# Patient Record
Sex: MALE | Race: WHITE | URBAN - METROPOLITAN AREA
[De-identification: names, ages, dates, MRNs, and addresses within clinical notes are randomized per-mention and may not be internally consistent; named-entity substitution may affect disease eponyms.]

---

## 2020-07-10 ENCOUNTER — HOSPITAL ENCOUNTER (OUTPATIENT)
Dept: LAB | Age: 30
Discharge: HOME | End: 2020-07-10

## 2020-07-10 ENCOUNTER — OFFICE VISIT (OUTPATIENT)
Dept: INTERNAL MEDICINE CLINIC | Age: 30
End: 2020-07-10

## 2020-07-10 VITALS
HEART RATE: 60 BPM | BODY MASS INDEX: 24.72 KG/M2 | DIASTOLIC BLOOD PRESSURE: 72 MMHG | SYSTOLIC BLOOD PRESSURE: 129 MMHG | OXYGEN SATURATION: 97 % | WEIGHT: 176.6 LBS | TEMPERATURE: 98.6 F | HEIGHT: 71 IN | RESPIRATION RATE: 18 BRPM

## 2020-07-10 DIAGNOSIS — L29.0 ANAL PRURITUS: ICD-10-CM

## 2020-07-10 DIAGNOSIS — Z00.00 PREVENTATIVE HEALTH CARE: ICD-10-CM

## 2020-07-10 DIAGNOSIS — Z00.00 PREVENTATIVE HEALTH CARE: Primary | ICD-10-CM

## 2020-07-10 LAB
ALBUMIN SERPL-MCNC: 4.6 G/DL (ref 3.5–5)
ALBUMIN/GLOB SERPL: 1.5 {RATIO} (ref 1.1–2.2)
ALP SERPL-CCNC: 72 U/L (ref 45–117)
ALT SERPL-CCNC: 26 U/L (ref 12–78)
ANION GAP SERPL CALC-SCNC: 5 MMOL/L (ref 5–15)
AST SERPL-CCNC: 14 U/L (ref 15–37)
BILIRUB SERPL-MCNC: 0.8 MG/DL (ref 0.2–1)
BUN SERPL-MCNC: 11 MG/DL (ref 6–20)
BUN/CREAT SERPL: 10 (ref 12–20)
CALCIUM SERPL-MCNC: 9.2 MG/DL (ref 8.5–10.1)
CHLORIDE SERPL-SCNC: 105 MMOL/L (ref 97–108)
CHOLEST SERPL-MCNC: 192 MG/DL
CO2 SERPL-SCNC: 27 MMOL/L (ref 21–32)
CREAT SERPL-MCNC: 1.12 MG/DL (ref 0.7–1.3)
ERYTHROCYTE [DISTWIDTH] IN BLOOD BY AUTOMATED COUNT: 12.7 % (ref 11.5–14.5)
GLOBULIN SER CALC-MCNC: 3.1 G/DL (ref 2–4)
GLUCOSE SERPL-MCNC: 87 MG/DL (ref 65–100)
HCT VFR BLD AUTO: 44.8 % (ref 36.6–50.3)
HDLC SERPL-MCNC: 61 MG/DL
HDLC SERPL: 3.1 {RATIO} (ref 0–5)
HGB BLD-MCNC: 15 G/DL (ref 12.1–17)
LDLC SERPL CALC-MCNC: 121 MG/DL (ref 0–100)
LIPID PROFILE,FLP: ABNORMAL
MCH RBC QN AUTO: 31 PG (ref 26–34)
MCHC RBC AUTO-ENTMCNC: 33.5 G/DL (ref 30–36.5)
MCV RBC AUTO: 92.6 FL (ref 80–99)
NRBC # BLD: 0 K/UL (ref 0–0.01)
NRBC BLD-RTO: 0 PER 100 WBC
PLATELET # BLD AUTO: 264 K/UL (ref 150–400)
PMV BLD AUTO: 11 FL (ref 8.9–12.9)
POTASSIUM SERPL-SCNC: 4.8 MMOL/L (ref 3.5–5.1)
PROT SERPL-MCNC: 7.7 G/DL (ref 6.4–8.2)
RBC # BLD AUTO: 4.84 M/UL (ref 4.1–5.7)
SODIUM SERPL-SCNC: 137 MMOL/L (ref 136–145)
TRIGL SERPL-MCNC: 50 MG/DL (ref ?–150)
VLDLC SERPL CALC-MCNC: 10 MG/DL
WBC # BLD AUTO: 3.9 K/UL (ref 4.1–11.1)

## 2020-07-10 NOTE — PROGRESS NOTES
HISTORY OF PRESENT ILLNESS    New patient to my practice, referred to me by his wife. Prior medical care has been from none. he works as a .  his  past medical history was reviewed, discussed, and summarized in the list below. Goes by [employer]". Wife Jf Burgos is RN [employer] Served in [employer] until 2016. Review of Systems   All other systems reviewed and are negative, except as noted in HPI      Past Medical and Surgical History  Past Medical History:   Diagnosis Date    History of dislocation of shoulder     hx labral tear, repair in high school        has a past surgical history that includes hx shoulder arthroscopy (Right). No current outpatient medications on file. No current facility-administered medications for this visit. reports that he has never smoked. He does not have any smokeless tobacco history on file. He reports current alcohol use. He reports that he does not use drugs. family history includes Cancer (age of onset: 62) in his father; Heart Attack in his paternal grandfather; Hypertension in his father; No Known Problems in his sister. Physical Exam   Nursing note and vitals reviewed. Blood pressure 129/72, pulse 60, temperature 98.6 °F (37 °C), resp. rate 18, height 5' 11\" (1.803 m), weight 176 lb 9.6 oz (80.1 kg), SpO2 97 %. Constitutional: oriented to person, place, and time. No distress. Eyes: Conjunctivae are normal.   HEENT:  No cervical lymphadenopathy. No thyroid nodules or goiter  Cardiovascular: Normal rate. Regular rhythm, no murmurs, rubs. No edema  Pulmonary/Chest: Effort normal. clear to auscultation  Abdominal: soft, non-tender, non-distended  Musculoskeletal:     Neurological: Alert and oriented to person, place. Cranial nerves grossly intact. Normal gait   Skin: No rash noted. Psychiatric: Normal mood and affect. Behavior is normal.     ASSESSMENT and PLAN  Diagnoses and all orders for this visit:    1.  Preventative health care  -     LIPID PANEL; Future  -     CBC W/O DIFF; Future  -     METABOLIC PANEL, COMPREHENSIVE;  Future        There are no Patient Instructions on file for this visit.    lab results and schedule of future lab studies reviewed with patient  reviewed medications and side effects in detail

## 2021-10-06 ENCOUNTER — OFFICE VISIT (OUTPATIENT)
Dept: INTERNAL MEDICINE CLINIC | Age: 31
End: 2021-10-06
Payer: COMMERCIAL

## 2021-10-06 VITALS
BODY MASS INDEX: 25.23 KG/M2 | RESPIRATION RATE: 16 BRPM | SYSTOLIC BLOOD PRESSURE: 121 MMHG | TEMPERATURE: 98.2 F | OXYGEN SATURATION: 98 % | HEIGHT: 71 IN | DIASTOLIC BLOOD PRESSURE: 75 MMHG | HEART RATE: 53 BPM | WEIGHT: 180.2 LBS

## 2021-10-06 DIAGNOSIS — Z00.00 PREVENTATIVE HEALTH CARE: Primary | ICD-10-CM

## 2021-10-06 DIAGNOSIS — Z11.59 ENCOUNTER FOR HEPATITIS C SCREENING TEST FOR LOW RISK PATIENT: ICD-10-CM

## 2021-10-06 DIAGNOSIS — Z23 ENCOUNTER FOR IMMUNIZATION: ICD-10-CM

## 2021-10-06 DIAGNOSIS — Z23 NEEDS FLU SHOT: ICD-10-CM

## 2021-10-06 PROCEDURE — 90686 IIV4 VACC NO PRSV 0.5 ML IM: CPT | Performed by: INTERNAL MEDICINE

## 2021-10-06 PROCEDURE — 90471 IMMUNIZATION ADMIN: CPT | Performed by: INTERNAL MEDICINE

## 2021-10-06 PROCEDURE — 90715 TDAP VACCINE 7 YRS/> IM: CPT | Performed by: INTERNAL MEDICINE

## 2021-10-06 PROCEDURE — 90472 IMMUNIZATION ADMIN EACH ADD: CPT | Performed by: INTERNAL MEDICINE

## 2021-10-06 PROCEDURE — 99395 PREV VISIT EST AGE 18-39: CPT | Performed by: INTERNAL MEDICINE

## 2021-10-07 LAB
ALBUMIN SERPL-MCNC: 4.5 G/DL (ref 3.5–5)
ALBUMIN/GLOB SERPL: 1.5 {RATIO} (ref 1.1–2.2)
ALP SERPL-CCNC: 65 U/L (ref 45–117)
ALT SERPL-CCNC: 22 U/L (ref 12–78)
ANION GAP SERPL CALC-SCNC: 8 MMOL/L (ref 5–15)
AST SERPL-CCNC: 24 U/L (ref 15–37)
BASOPHILS # BLD: 0.1 K/UL (ref 0–0.1)
BASOPHILS NFR BLD: 1 % (ref 0–1)
BILIRUB SERPL-MCNC: 0.6 MG/DL (ref 0.2–1)
BUN SERPL-MCNC: 13 MG/DL (ref 6–20)
BUN/CREAT SERPL: 16 (ref 12–20)
CALCIUM SERPL-MCNC: 9.9 MG/DL (ref 8.5–10.1)
CHLORIDE SERPL-SCNC: 108 MMOL/L (ref 97–108)
CHOLEST SERPL-MCNC: 194 MG/DL
CO2 SERPL-SCNC: 22 MMOL/L (ref 21–32)
CREAT SERPL-MCNC: 0.81 MG/DL (ref 0.7–1.3)
DIFFERENTIAL METHOD BLD: NORMAL
EOSINOPHIL # BLD: 0.3 K/UL (ref 0–0.4)
EOSINOPHIL NFR BLD: 5 % (ref 0–7)
ERYTHROCYTE [DISTWIDTH] IN BLOOD BY AUTOMATED COUNT: 12.4 % (ref 11.5–14.5)
GLOBULIN SER CALC-MCNC: 3 G/DL (ref 2–4)
GLUCOSE SERPL-MCNC: 93 MG/DL (ref 65–100)
HCT VFR BLD AUTO: 45.1 % (ref 36.6–50.3)
HCV AB SER IA-ACNC: 2.46 INDEX
HCV AB SERPL QL IA: REACTIVE
HDLC SERPL-MCNC: 60 MG/DL
HDLC SERPL: 3.2 {RATIO} (ref 0–5)
HGB BLD-MCNC: 15.4 G/DL (ref 12.1–17)
IMM GRANULOCYTES # BLD AUTO: 0 K/UL (ref 0–0.04)
IMM GRANULOCYTES NFR BLD AUTO: 0 % (ref 0–0.5)
LDLC SERPL CALC-MCNC: 112.4 MG/DL (ref 0–100)
LYMPHOCYTES # BLD: 2.6 K/UL (ref 0.8–3.5)
LYMPHOCYTES NFR BLD: 49 % (ref 12–49)
MCH RBC QN AUTO: 31.9 PG (ref 26–34)
MCHC RBC AUTO-ENTMCNC: 34.1 G/DL (ref 30–36.5)
MCV RBC AUTO: 93.4 FL (ref 80–99)
MONOCYTES # BLD: 0.6 K/UL (ref 0–1)
MONOCYTES NFR BLD: 10 % (ref 5–13)
NEUTS SEG # BLD: 2 K/UL (ref 1.8–8)
NEUTS SEG NFR BLD: 35 % (ref 32–75)
NRBC # BLD: 0 K/UL (ref 0–0.01)
NRBC BLD-RTO: 0 PER 100 WBC
PLATELET # BLD AUTO: 259 K/UL (ref 150–400)
PMV BLD AUTO: 11.6 FL (ref 8.9–12.9)
POTASSIUM SERPL-SCNC: 4.7 MMOL/L (ref 3.5–5.1)
PROT SERPL-MCNC: 7.5 G/DL (ref 6.4–8.2)
RBC # BLD AUTO: 4.83 M/UL (ref 4.1–5.7)
RBC MORPH BLD: NORMAL
SODIUM SERPL-SCNC: 138 MMOL/L (ref 136–145)
TRIGL SERPL-MCNC: 108 MG/DL (ref ?–150)
VLDLC SERPL CALC-MCNC: 21.6 MG/DL
WBC # BLD AUTO: 5.6 K/UL (ref 4.1–11.1)

## 2021-10-07 NOTE — PROGRESS NOTES
Denisse Arana is a 28 y.o. male  Presenting for his annual checkup and health maintenance review and follow-up  Stone eCnteno was last seen by me July 2020. He works as an . Happily, he and his wife Samuel Alvarado have welcomed their second daughter yesterday. She is doing well. Exercise: moderately active  Diet: generally follows a low fat low cholesterol diet  Health Maintenance   Topic Date Due    Hepatitis C Screening  Never done    DTaP/Tdap/Td series (2 - Td or Tdap) 10/06/2031    Flu Vaccine  Completed    COVID-19 Vaccine  Completed    Pneumococcal 0-64 years  Aged Out     Health Maintenance reviewed  Last digital rectal exam:  none  No results found for: PSA, Artem Rodri, [id number], [id number]    Vaccinations reviewed  Immunization History   Administered Date(s) Administered    Covid-19, MODERNA, Mrna, Lnp-s, Pf, 100mcg/0.5mL 04/09/2021, 05/14/2021    Influenza Vaccine (Quad) PF (>6 Mo Flulaval, Fluarix, and >3 Yrs [address] [place] [address], Fluzone [address]) 10/06/2021    Tdap 10/06/2021       Past Medical History:   Diagnosis Date    History of dislocation of shoulder     hx labral tear, repair in high school      has a past surgical history that includes hx shoulder arthroscopy (Right). Patient has no known allergies. No current outpatient medications on file. No current facility-administered medications for this visit. SOCIAL HX:  reports that he has never smoked. He has never used smokeless tobacco. He reports current alcohol use. He reports that he does not use drugs. FAMILY HX: family history includes Cancer (age of onset: 62) in his father; Heart Attack (age of onset: 48) in his paternal grandfather; Hypertension in his father; No Known Problems in his sister.     Review of Systems - History obtained from the patient  General ROS: negative for - night sweats, weight gain or weight loss  Cardiovascular ROS: no chest pain, dyspnea on exertion, edema    Physical exam  Blood pressure 121/75, pulse (!) 53, temperature 98.2 °F (36.8 °C), temperature source Oral, resp. rate 16, height 5' 11\" (1.803 m), weight 180 lb 3.2 oz (81.7 kg), SpO2 98 %. Wt Readings from Last 3 Encounters:   10/06/21 180 lb 3.2 oz (81.7 kg)   07/10/20 176 lb 9.6 oz (80.1 kg)     He appears well, alert and oriented x 3, pleasant and cooperative. Vitals as noted. No rashes or significant lesions. Neck supple and free of adenopathy, or masses. No thyromegaly or carotid bruits. Cranial nerves normal. Lungs are clear to auscultation. Heart sounds are normal with no murmurs, clicks, gallops or rubs. Abdomen is soft, non- tender, with no masses or organomegaly. Extremities, peripheral pulses and reflexes are normal.  . RECTAL/PROSTATE EXAM: Not indicated. Skin is without rashes or suspicious lesions. Assessment and Plan:    1. Preventative health care  Healthy 62-year-old male. Offered my support and congratulations for the birth of his second child yesterday. - CBC WITH AUTOMATED DIFF  - HEPATITIS C AB  - LIPID PANEL  - METABOLIC PANEL, COMPREHENSIVE    2. Encounter for hepatitis C screening test for low risk patient  - HEPATITIS C AB    3. Encounter for immunization  Recommend Tdap given that he has a . - TETANUS, DIPHTHERIA TOXOIDS AND ACELLULAR PERTUSSIS VACCINE (TDAP), IN INDIVIDS. >=7, IM    4.  Needs flu shot  Recommend flu shot  - TX IMMUNIZ ADMIN,1 SINGLE/COMB VAC/TOXOID  - INFLUENZA VIRUS VAC QUAD,SPLIT,PRESV FREE SYRINGE IM      The patient is asked to make an attempt to improve diet and exercise patterns  Avoid tobacco products, excessive alcohol    Return for yearly wellness visits

## 2021-10-10 DIAGNOSIS — R76.8 HEPATITIS C ANTIBODY POSITIVE IN BLOOD: Primary | ICD-10-CM

## 2021-10-11 ENCOUNTER — TELEPHONE (OUTPATIENT)
Dept: INTERNAL MEDICINE CLINIC | Age: 31
End: 2021-10-11

## 2021-10-11 NOTE — TELEPHONE ENCOUNTER
----- Message from Jeremias Trevino MD sent at 10/10/2021  9:55 PM EDT -----  Results reviewed. See MyChart Comment.    Please add on Hep C RNA test to [facility] labs, as ordered

## 2021-10-11 NOTE — TELEPHONE ENCOUNTER
Call made to patient to advise of providers message. Regarding lab results. Nurse completed add on labs form. Patient was thankful for the call.

## 2021-10-13 LAB — HCV RNA SERPL QL NAA+PROBE: NEGATIVE

## 2025-02-04 SDOH — HEALTH STABILITY: PHYSICAL HEALTH: ON AVERAGE, HOW MANY MINUTES DO YOU ENGAGE IN EXERCISE AT THIS LEVEL?: 60 MIN

## 2025-02-04 SDOH — HEALTH STABILITY: PHYSICAL HEALTH: ON AVERAGE, HOW MANY DAYS PER WEEK DO YOU ENGAGE IN MODERATE TO STRENUOUS EXERCISE (LIKE A BRISK WALK)?: 1 DAY

## 2025-02-05 ENCOUNTER — OFFICE VISIT (OUTPATIENT)
Facility: CLINIC | Age: 35
End: 2025-02-05
Payer: COMMERCIAL

## 2025-02-05 VITALS
TEMPERATURE: 97.6 F | DIASTOLIC BLOOD PRESSURE: 78 MMHG | HEIGHT: 71 IN | OXYGEN SATURATION: 97 % | WEIGHT: 184.2 LBS | SYSTOLIC BLOOD PRESSURE: 122 MMHG | BODY MASS INDEX: 25.79 KG/M2 | HEART RATE: 83 BPM

## 2025-02-05 DIAGNOSIS — Z00.00 PREVENTATIVE HEALTH CARE: Primary | ICD-10-CM

## 2025-02-05 DIAGNOSIS — Z11.4 SCREENING FOR HIV WITHOUT PRESENCE OF RISK FACTORS: ICD-10-CM

## 2025-02-05 DIAGNOSIS — Z00.00 PREVENTATIVE HEALTH CARE: ICD-10-CM

## 2025-02-05 LAB
ALBUMIN SERPL-MCNC: 4.6 G/DL (ref 3.5–5)
ALBUMIN/GLOB SERPL: 1.6 (ref 1.1–2.2)
ALP SERPL-CCNC: 87 U/L (ref 45–117)
ALT SERPL-CCNC: 22 U/L (ref 12–78)
ANION GAP SERPL CALC-SCNC: 6 MMOL/L (ref 2–12)
AST SERPL-CCNC: 17 U/L (ref 15–37)
BILIRUB SERPL-MCNC: 0.5 MG/DL (ref 0.2–1)
BUN SERPL-MCNC: 12 MG/DL (ref 6–20)
BUN/CREAT SERPL: 13 (ref 12–20)
CALCIUM SERPL-MCNC: 9.8 MG/DL (ref 8.5–10.1)
CHLORIDE SERPL-SCNC: 105 MMOL/L (ref 97–108)
CHOLEST SERPL-MCNC: 198 MG/DL
CO2 SERPL-SCNC: 29 MMOL/L (ref 21–32)
CREAT SERPL-MCNC: 0.92 MG/DL (ref 0.7–1.3)
ERYTHROCYTE [DISTWIDTH] IN BLOOD BY AUTOMATED COUNT: 12.5 % (ref 11.5–14.5)
GLOBULIN SER CALC-MCNC: 2.9 G/DL (ref 2–4)
GLUCOSE SERPL-MCNC: 87 MG/DL (ref 65–100)
HCT VFR BLD AUTO: 45.8 % (ref 36.6–50.3)
HDLC SERPL-MCNC: 55 MG/DL
HDLC SERPL: 3.6 (ref 0–5)
HGB BLD-MCNC: 15.4 G/DL (ref 12.1–17)
HIV 1+2 AB+HIV1 P24 AG SERPL QL IA: NONREACTIVE
HIV 1/2 RESULT COMMENT: NORMAL
LDLC SERPL CALC-MCNC: 121.8 MG/DL (ref 0–100)
MCH RBC QN AUTO: 30.9 PG (ref 26–34)
MCHC RBC AUTO-ENTMCNC: 33.6 G/DL (ref 30–36.5)
MCV RBC AUTO: 91.8 FL (ref 80–99)
NRBC # BLD: 0 K/UL (ref 0–0.01)
NRBC BLD-RTO: 0 PER 100 WBC
PLATELET # BLD AUTO: 312 K/UL (ref 150–400)
PMV BLD AUTO: 10.6 FL (ref 8.9–12.9)
POTASSIUM SERPL-SCNC: 4.3 MMOL/L (ref 3.5–5.1)
PROT SERPL-MCNC: 7.5 G/DL (ref 6.4–8.2)
RBC # BLD AUTO: 4.99 M/UL (ref 4.1–5.7)
SODIUM SERPL-SCNC: 140 MMOL/L (ref 136–145)
TRIGL SERPL-MCNC: 106 MG/DL
VLDLC SERPL CALC-MCNC: 21.2 MG/DL
WBC # BLD AUTO: 5 K/UL (ref 4.1–11.1)

## 2025-02-05 PROCEDURE — 99385 PREV VISIT NEW AGE 18-39: CPT | Performed by: INTERNAL MEDICINE

## 2025-02-05 SDOH — ECONOMIC STABILITY: FOOD INSECURITY: WITHIN THE PAST 12 MONTHS, YOU WORRIED THAT YOUR FOOD WOULD RUN OUT BEFORE YOU GOT MONEY TO BUY MORE.: NEVER TRUE

## 2025-02-05 SDOH — ECONOMIC STABILITY: FOOD INSECURITY: WITHIN THE PAST 12 MONTHS, THE FOOD YOU BOUGHT JUST DIDN'T LAST AND YOU DIDN'T HAVE MONEY TO GET MORE.: NEVER TRUE

## 2025-02-05 ASSESSMENT — PATIENT HEALTH QUESTIONNAIRE - PHQ9
SUM OF ALL RESPONSES TO PHQ9 QUESTIONS 1 & 2: 0
2. FEELING DOWN, DEPRESSED OR HOPELESS: NOT AT ALL
SUM OF ALL RESPONSES TO PHQ QUESTIONS 1-9: 0
1. LITTLE INTEREST OR PLEASURE IN DOING THINGS: NOT AT ALL

## 2025-02-05 NOTE — PROGRESS NOTES
Michael Manuel is a 34 y.o. male  Presenting as new patient visit annual checkup and health maintenance review and follow-up  History of Present Illness      He reports no significant health issues, with the exception of occasional indigestion. He attributes this to his past beer consumption, which has since decreased. He does not require daily medication or frequent use of Pepto-Bismol. He also notes that his indigestion is exacerbated by morning coffee consumption on an empty stomach. He believes that cessation of coffee intake would alleviate his symptoms. He expresses interest in undergoing an endoscopy if his symptoms persist despite dietary modifications.    He has received several vaccinations during his  service, including the anthrax vaccine. He used to get influenza vaccine every year but has not done it in the past 2 years. He is up to date on his Tdap vaccine, which he received in 2021.     He is inquiring about the necessity of blood work at this time. He tested positive for hepatitis C when he was in college and donated blood. He was sent a letter stating that he should not donate blood in the future because they had to do additional testing.  He is not aware of any exposures.  Has never used IV drugs and has never had a transfusion.  Has tested repeatedly since that time and HCVRNA is always negative, indicating cured primary infection    SOCIAL HISTORY  He has 3 daughters, aged 6 years, 3 years, and 7 months. He works as an  at [employer]. He has no history of smoking or alcohol consumption.  He is an .  Former [employer]  Wife Neli ([facility] V[employer] BOSCH RN).      Family History   Problem Relation Age of Onset    No Known Problems Mother     Hypertension Father     Cancer Father 58        Esophogeal Cancer (no tobacco)    No Known Problems Sister     Dementia Maternal Grandmother 85    Obesity Maternal Grandfather     Diabetes Maternal Grandfather         borderline

## 2025-02-05 NOTE — PROGRESS NOTES
Identified pt with two pt identifiers(name and ). Reviewed record in preparation for visit and have obtained necessary documentation. All patient medications has been reviewed.  Chief Complaint   Patient presents with    Annual Exam    Establish Care       Health Maintenance Due   Topic    Depression Screen     HIV screen     Varicella vaccine (1 of 2 - 13+ 2-dose series)    Hepatitis B vaccine (3 of 3 - Hep B Twinrix 3-dose series)    Polio vaccine (2 of 3 - Adult catch-up series)    Flu vaccine (1)    COVID-19 Vaccine (3 -  season)     Health Maintenance Review: Patient reminded of \"due or due soon\" health maintenance. I have asked the patient to contact his/her primary care provider (PCP) for follow-up on his/her health maintenance.    Wt Readings from Last 3 Encounters:   25 83.6 kg (184 lb 3.2 oz)   10/06/21 81.7 kg (180 lb 3.2 oz)   07/10/20 80.1 kg (176 lb 9.6 oz)     Temp Readings from Last 3 Encounters:   25 97.6 °F (36.4 °C)     BP Readings from Last 3 Encounters:   25 122/78   10/06/21 121/75   07/10/20 129/72     Pulse Readings from Last 3 Encounters:   25 83   10/06/21 53   07/10/20 60       1. \"Have you been to the ER, urgent care clinic since your last visit?  Hospitalized since your last visit?\" No    2. \"Have you seen or consulted any other health care providers outside of the [facility] since your last visit?\" No     3. For patients aged 45-75: Has the patient had a colonoscopy / FIT/ Cologuard? NA - based on age    Patient is accompanied by self I have received verbal consent from Michael Manuel to discuss any/all medical information while they are present in the room.